# Patient Record
Sex: MALE | Race: WHITE
[De-identification: names, ages, dates, MRNs, and addresses within clinical notes are randomized per-mention and may not be internally consistent; named-entity substitution may affect disease eponyms.]

---

## 2018-05-17 NOTE — PULMONARY FUNCTION TEST
Pulmonary Function Test


Date of Procedure:: 05/17/18


INDICATION:: Dyspnea chronic respiratory failure


Referring Provider: DR. DANIEL FUNEZ


Technician: Mauri Lindsay RRT,RCP





- Report


Spirometry: 





FVC 1.80 L 39%    postbronchodilator 2.74 liters 58%





FEV1 0.73 L 19%    postbronchodilator 0.74 L 20%





FEV1/FVC % 41    postbronchodilator 27    predicted 80





FEF 25-75% 0.46 12%    postbronchodilator 0.26 7%


Impression: 





Severe obstructive ventilatory defect with insignificant response to 

bronchodilator therapy this in and of itself does not preclude a clinical trial 

of bronchodilator therapy.Restrictive defect is implied but not diagnosed on 

spirometry alone.  (Restrictive defect may mask the degree of obstruction)

## 2018-05-17 NOTE — RADIOLOGY REPORT (SQ)
EXAM DESCRIPTION: Right upper quadrant ultrasound



CLINICAL HISTORY: RUQ and LUQ pain, pancreatitis 



COMPARISON: None.



TECHNIQUE: Real-time sonographic images of the right upper

abdomen were obtained using a curved multihertz transducer.



FINDINGS:



Pancreas: The visualized portions of the pancreas are

unremarkable.



Vascular: The visualized portions of the aorta and IVC are

unremarkable.



Liver: The liver has normal contour and echogenicity. Hepatopedal

flow in the portal vein. The common bile duct measures 0.4 cm.



Gallbladder: Normal gallbladder wall thickness. Mobile echogenic

structures with posterior shadowing. No pericholecystic fluid.



Right Kidney: The right kidney measures 11.3 cm in length. No

hydronephrosis, solid renal mass, or shadowing calculi.







IMPRESSION:



1. Cholelithiasis without other evidence of acute cholecystitis.

## 2018-05-17 NOTE — RADIOLOGY REPORT (SQ)
EXAM DESCRIPTION:  CHOLANGIOGRAM OPERATIVE



COMPLETED DATE/TIME:  5/17/2018 5:44 pm



REASON FOR STUDY:  CHOLANGIOGRAM I11.0  HYPERTENSIVE HEART DISEASE WITH HEART FAILURE



COMPARISON:  None.



FLUOROSCOPY TIME:  0 minutes.

2 images saved to PACS.



TECHNIQUE:  2 images were obtained from an intraoperative cholangiogram.



LIMITATIONS:  None.



FINDINGS:  There is opacification of the bile ducts, cystic duct remnants and second portion of the d
uodenum without evidence of fixed filling defect or significant extravasation.



IMPRESSION:  INTRAOPERATIVE CHOLANGIOGRAM.



COMMENT:  Quality :  Final reports for procedures using fluoroscopy that document radiation exp
osure indices, or exposure time and number of fluorographic images (if radiation exposure indices are
 not available)



TECHNICAL DOCUMENTATION:  JOB ID:  [id number]

 2011 [employer]- All Rights Reserved



Reading location - IP/workstation name: [email or web address]

## 2018-05-17 NOTE — PDOC CONSULTATION
Consultation


Consult Date: 18


Consult reason:: abdominal pain, pancreatitis





History of Present Illness


Admission Date/PCP: 


  18 07:42





  ABDULAZIZ EVANS MD





Patient complains of: abdominal pain


History of Present Illness: 


PING WALKER is a 67 year old male with a 4 day hx of not feeling well;  last 

aftrnoon he started c/o diffuse abdominal pain, then retrosternal as per 

heartburn.  He prsented to the and he underwent blood work significant for 

elevated lipase and US of the gallbladder significant for gallstones.  The 

patient has a severe personal and family hx of COPD (father and grandfather 

 of COPD and he was a heavy smoker, 1-2 ppd for years) and he is oxygen 

dependent.








Past Medical History


Cardiac Medical History: Reports: Hyperlipidema, Hypertension


Pulmonary Medical History: Reports: Chronic Obstructive Pulmonary Disease (COPD)


GI Medical History: Reports: Gastroesophageal Reflux Disease





Social History


Smoking Status: Never Smoker





Family History


Family History: Reviewed & Not Pertinent


Parental Family History Reviewed: Yes - COPD


Children Family History Reviewed: No


Sibling(s) Family History Reviewed.: No





Medication/Allergy


Home Medications: 








Albuterol Sulfate [Proventil Hfa] 1 - 2 puff IH QID PRN 18 


Aspirin [Aspirin 81 mg Chewable Tablet] 1 tab PO DAILY 18 


Atorvastatin Calcium 1 cap PO DAILY 18 


Budesonide/Formoterol Fumarate [Symbicort 160-4.5 Mcg Inhaler] 1 - 2 puff IH 

BID 18 


Diltiazem HCl [Dilt-Xr] 1 cap PO DAILY 18 


Guaifenesin/P-Ephed HCl [Mucinex D Tablet] 1 each PO DAILY 18 


Magnesium 3 tab PO DAILY 18 


Omeprazole 2 cap PO DAILY 18 


Tiotropium Bromide [Spiriva Handihaler 18 mcg/dose (30 Dose)] 1 cap IH DAILY  


Triamterene/Hydrochlorothiazid [Triamterene-Hctz 37.5-25 mg Cp] 1 each PO DAILY 

18 








Allergies/Adverse Reactions: 


 





No Known Allergies Allergy (Verified 18 05:56)


 











Physical Exam


Vital Signs: 


 











Temp Pulse Resp BP Pulse Ox


 


 97.5 F   69   16   125/61   98 


 


 18 05:20  05/17/18 05:20  18 07:01  18 07:01  18 07:01











General appearance: PRESENT: no acute distress, cooperative


Eye exam: PRESENT: conjunctiva pink, EOMI


Mouth exam: PRESENT: neck supple


Neck exam: PRESENT: full ROM


Respiratory exam: PRESENT: clear to auscultation arturo


Cardiovascular exam: PRESENT: RRR


GI/Abdominal exam: PRESENT: distended, soft, tenderness - RUQ, no peritoneal 

signs


Extremities exam: PRESENT: full ROM


Musculoskeletal exam: PRESENT: full ROM





Results


Impressions: 


 





Abdomen Ultrasound  18 06:12


IMPRESSION:


 


1. Cholelithiasis without other evidence of acute cholecystitis.


 














Assessment & Plan





- Plan Summary


Plan Summary: 





A/





Abdominal pain


Elevated lipase (743)


Leukocytosis (14.7)


US of the gallbladder demonstrated gallstones, no evidence of cholecystitis


CBD 4 mm


total bilirubin 1.2, with slight elevation of the direct bilirubin (0.8)


Severe COPD with multiple inhaler daily regimen


Oxygen dependency





P/





NPO


IVF


Laparoscopic cholecystectomy, possible open, possible cholangiogram


Mefoxin 2 gr IVPB preop


preop EKG


Patient to be admitted by Hospitalist service








Procedure, risks, benefits, complications explained to the patient including 

bleeding form the liver and / or injury of the common bile duct which might 

require transfer to a tertiary center for repair.  he understands all the above 

and decided to proceed.

## 2018-05-17 NOTE — RADIOLOGY REPORT (SQ)
EXAM DESCRIPTION:  CHEST 2 VIEWS



COMPLETED DATE/TIME:  5/17/2018 7:53 am



REASON FOR STUDY:  pre op



COMPARISON:  None.



EXAM PARAMETERS:  NUMBER OF VIEWS: two views

TECHNIQUE: Digital Frontal and Lateral radiographic views of the chest acquired.

RADIATION DOSE: NA

LIMITATIONS: none



FINDINGS:  LUNGS AND PLEURA: There is some minimal linear densities in the right lung base most consi
stent with atelectatic changes.  Chronic appearing changes are identified.  Remaining lung fields are
 clear.  No pleural effusions are identified.  I cannot exclude a component of obstructive lung disea
se.

MEDIASTINUM AND HILAR STRUCTURES: No masses or contour abnormalities.

HEART AND VASCULAR STRUCTURES: Heart normal size.  No evidence for failure.

BONES: No acute findings.

HARDWARE: None in the chest.

OTHER: No other significant finding.



IMPRESSION:  Minimal linear densities in the right lung base most consistent with atelectatic changes
.  Remaining lung fields are free of active infiltrates.  Other findings as noted above



TECHNICAL DOCUMENTATION:  JOB ID:  [id number]

 2011 [employer]- All Rights Reserved



Reading location - IP/workstation name: [email or web address]

## 2018-05-17 NOTE — OPERATIVE REPORT
Operative Report


DATE OF SURGERY: 05/17/18


PREOPERATIVE DIAGNOSIS: 1.  Acute cholecystitis with cholelithiasis.  2.  

Elevated lipase


POSTOPERATIVE DIAGNOSIS: Same with grossly normal intraoperative cholangiogram


OPERATION: 1.  Laparoscopic cholecystectomy.  2.  Performance of intraoperative 

cholangiography.  3.  Interpretation of intraoperative cholangiography.  4.  

Extremely difficult modifier


SURGEON: THAO BHATTI


ANESTHESIA: GA


TISSUE REMOVED OR ALTERED: Gallbladder with contents


COMPLICATIONS: 





None


ESTIMATED BLOOD LOSS: 250


INTRAOPERATIVE FINDINGS: See below


PROCEDURE: 


After obtaining informed consent, the patient was taken to the operating room.  

General  Anesthesia was induced; the arms were extended, and the abdomen was 

exposed, and prepped and draped in a sterile fashion.  Instrumentation was set 

up for laparoscopic cholecystectomy.


 


Surgical plan and surgical timeout were conducted.


 


A vertical incision was made above the umbilicus, and a verres needle was 

inserted uneventfully into the peritoneal cavity.  Pneumoperitoneum was 

established.


 


The verres needle was removed and a 5 mm trocar was inserted and a 5 mm 

flexible laparoscope was inserted.  Visualization of the peritoneal cavity 

confirmed safe uneventful entry.  Under direct visualization 3 additional 5 mm 

ports were established, one in the subxiphoid position and second in the 

subcostal position.





The findings are significant for acutely slightly shrunken down but dilated 

edematous gallbladder consistent with acute cholecystitis.  Using a 

percutaneous laparoscopic trocar, we aspirated the gallbladder of white clear 

bile.  Grasper placed in the gallbladder fundus and infundibulum, adhesions 

were taken down between the gallbladder and the gastroduodenal region.  Despite 

taking these adhesions down, the tissue was so edematous it was very difficult 

to get a grasp of the infundibulum.





Therefore we switch to a top down approach.  The gallbladder was removed from 

the liver bed using hook cautery dissection in a methodical painstaking 

fashion.  This took approximately 40 minutes to 1 hour due to adhesions, edema, 

bleeding.  We used Surgicel to control bleeding mostly from the liver bed.





Eventually had the gallbladder suspended from its infundibulum.  We now used 

harmonic scalpel to skeletonize the neck of the gallbladder and eventually 

getting the cystic duct in view.  This was a edematous cystic duct with 

fibrinous peel around it and I decided to amputate the gallbladder from the 

neck leaving the cystic duct open and exposed.  The gallbladder was placed at 

the side.  It had been opened with the spillage of some stones earlier in the 

operation.  The cystic duct opening was draining clear bile which was 

encouraging.  We elected to proceed with the intraoperative cholangiogram





The balloon secured cholangiogram catheter was brought onto the field, and 

advanced through 1 of the right upper quadrant trochars.  The cholangiogram was 

threaded into the opening of the cystic duct, and the balloon inflated.





We level the patient out, and proceeded to perform a series of intraoperative 

cholangiogram using full strength Isovue contrast approximately 15 cc total 

delivered to the patient.  Visualization using intraoperative fluoroscopy 

showed illumination of the common hepatic duct, common bile duct, the majority 

of the right and some of the left hepatic branches.  There was contrast flowing 

into the duodenum.  There was some extravasation around the catheter exit site.

  There did not appear to be a violation the common bile duct or filling defect 

therefore we felt that essentially the cholangiogram was within normal limits.





We level the patient out, remove the cholangiogram catheter, and secured the 

cystic duct opening placing a 0 Endoloop into position with success.





We now control bleeding from the liver bed with Surgicel which was felt to be 

satisfactory.  A drain was placed in the right upper quadrant, 15 Swedish Aung.

  We felt the total 250 cc of bleeding was a reasonable estimate.  There was 

nothing mechanical to cauterize or oversew at this point.  We did place the 

specimen, gallbladder with stones in an Endobag and brought the estimate out of 

the patient after extending the supraumbilical port site incision





Specimen was sent to pathology.  We returned the peritoneal cavity check for 

bleeding there was none.  Drain was placed in the subhepatic position and 

secured to the skin with 2-0 suture.  Wounds closed with 0 Vicryl 3-0 Vicryl 

benzoin Steri-Strips








 


The patient was extubated, and taken to the recovery room in stable condition.

## 2018-05-17 NOTE — PDOC H&P
History of Present Illness


Admission Date/PCP: 


  05/17/18 07:42





  ABDULAZIZ EVANS MD





Patient complains of: Epigastric abdominal pain


History of Present Illness: 


PING WALKER is a 67 year old male with a past medical history of





COPD on 3 L by nasal cannula


Chronic hypoxic respiratory failure


Hypertension


GERD


Hyperlipidemia


Vitamin D deficiency





Past surgical history: None





Outpatient medications:


Cardizem  mg daily


Triamterene/hydrochlorothiazide 37.5/25 mg daily


Aspirin 81 mg daily


Prilosec 40 mg daily


Magnesium daily


Vitamin D3 daily


Mucinex daily


Spiriva


Albuterol MDI as needed


Symbicort





He presented to the hospital after developing sudden onset epigastric pain 

which was sharp and colicky.


He has been constipated for the past 2 days.


Denies any vomiting.  Appetite has been poor.


He was recently being treated with antibiotics that were started 4 days ago for 

a urinary tract infection.








Healthcare power of  is his son Chavez Walker.  Phone #[phone].


He requests to be a full code.





The patient reports chronic dyspnea on exertion which is unchanged.


Denies shortness of breath or chest pain at rest.  Denies exertional chest pain.


Does not recall ever having a stress test.


Denies a history of stroke or myocardial infarction.


No recent fever cough or illness other than a urinary tract infection.





Past Medical History


Cardiac Medical History: Reports: Hyperlipidema, Hypertension


Pulmonary Medical History: Reports: Chronic Obstructive Pulmonary Disease (COPD)


GI Medical History: Reports: Gastroesophageal Reflux Disease





Social History


Information Source: Patient


Smoking Status: Former Smoker


Frequency of Alcohol Use: None


Hx Recreational Drug Use: No





- Advance Directive


Resuscitation Status: Full Code





Family History


Family History: COPD


Parental Family History Reviewed: Yes


Children Family History Reviewed: Yes


Sibling(s) Family History Reviewed.: Yes





Medication/Allergy


Home Medications: 








Albuterol Sulfate [Proventil Hfa] 1 - 2 puff IH QID PRN 05/17/18 


Aspirin [Aspirin 81 mg Chewable Tablet] 1 tab PO DAILY 05/17/18 


Atorvastatin Calcium 1 cap PO DAILY 05/17/18 


Budesonide/Formoterol Fumarate [Symbicort 160-4.5 Mcg Inhaler] 1 - 2 puff IH 

BID 05/17/18 


Diltiazem HCl [Dilt-Xr] 1 cap PO DAILY 05/17/18 


Guaifenesin/P-Ephed HCl [Mucinex D Tablet] 1 each PO DAILY 05/17/18 


Magnesium 3 tab PO DAILY 05/17/18 


Omeprazole 2 cap PO DAILY 05/17/18 


Tiotropium Bromide [Spiriva Handihaler 18 mcg/dose (30 Dose)] 1 cap IH DAILY 05/ 17/18 


Triamterene/Hydrochlorothiazid [Triamterene-Hctz 37.5-25 mg Cp] 1 each PO DAILY 

05/17/18 








Allergies/Adverse Reactions: 


 





No Known Allergies Allergy (Verified 05/17/18 05:56)


 











Review of Systems


Constitutional: ABSENT: fever(s)


Eyes: ABSENT: visual disturbances


Ears: ABSENT: hearing changes


Nose, Mouth, and Throat: ABSENT: sore throat


Cardiovascular: PRESENT: dyspnea on exertion.  ABSENT: chest pain, edema


Respiratory: ABSENT: cough


Gastrointestinal: PRESENT: abdominal pain, constipation.  ABSENT: vomiting


Genitourinary: ABSENT: hematuria


Musculoskeletal: ABSENT: joint swelling


Integumentary: ABSENT: rash


Neurological: ABSENT: focal weakness


Psychiatric: ABSENT: hallucinations


Endocrine: ABSENT: heat intolerance


Hematologic/Lymphatic: ABSENT: easy bleeding


Allergic/Immunologic: ABSENT: seasonal rhinorrhea





Physical Exam


Vital Signs: 


 











Temp Pulse Resp BP Pulse Ox


 


 97.5 F   69   16   125/61   98 


 


 05/17/18 05:20  05/17/18 05:20  05/17/18 07:01  05/17/18 07:01  05/17/18 07:01











General appearance: PRESENT: no acute distress


Head exam: PRESENT: normocephalic


Eye exam: PRESENT: PERRLA


Ear exam: PRESENT: normal external ear exam


Mouth exam: PRESENT: moist


Throat exam: ABSENT: post pharyngeal erythema


Neck exam: ABSENT: tenderness


Respiratory exam: PRESENT: clear to auscultation arturo, symmetrical, unlabored


Cardiovascular exam: PRESENT: RRR.  ABSENT: systolic murmur


Vascular exam: ABSENT: pallor


GI/Abdominal exam: PRESENT: normal bowel sounds, soft, tenderness


Rectal exam: PRESENT: deferred


Gentrourinary exam: ABSENT: indwelling catheter


Extremities exam: ABSENT: pedal edema


Musculoskeletal exam: PRESENT: normal inspection


Neurological exam: PRESENT: alert, awake, oriented to person, oriented to place

, oriented to time, oriented to situation


Psychiatric exam: PRESENT: appropriate affect


Skin exam: ABSENT: rash





Results


Impressions: 


 





Abdomen Ultrasound  05/17/18 06:12


IMPRESSION:


 


1. Cholelithiasis without other evidence of acute cholecystitis.


 














Assessment & Plan





- Diagnosis


(1) COPD (chronic obstructive pulmonary disease)


Qualifiers: 


   COPD type: emphysema   Emphysema type: unspecified   Qualified Code(s): 

J43.9 - Emphysema, unspecified   


Is this a current diagnosis for this admission?: Yes   


Plan: 


Stable.  Continue oxygen supplementation.  Continue Spiriva and Symbicort.


Nebs as needed.


Pulmonology evaluation for optimization prior to surgery.








(2) Chronic respiratory failure with hypoxia


Is this a current diagnosis for this admission?: Yes   


Plan: 


Stable.  As above.








(3) Hypertension


Is this a current diagnosis for this admission?: Yes   


Plan: 


Continue Cardizem.  Hold diuretics for now given n.p.o.








(4) Hyperlipidemia


Is this a current diagnosis for this admission?: Yes   


Plan: 


Restart statin post surgery.


Aspirin on hold.








(5) GERD (gastroesophageal reflux disease)


Is this a current diagnosis for this admission?: Yes   


Plan: 


Proton pump inhibitor.








(6) Gallstone pancreatitis


Is this a current diagnosis for this admission?: Yes   


Plan: 


Management per surgical service.  N.p.o.  Analgesics for pain control.








- Time


Time Spent: 50 to 70 Minutes





- Inpatient Certification


Medical Necessity: Need for Surgery, Risk of Complication if Not Cared For in 

Hospital

## 2018-05-17 NOTE — PDOC CONSULTATION
Consultation


Consult Date: 18


Attending physician:: DANIEL FUNEZ


Consult reason:: Chronic respiratory failure





History of Present Illness


Admission Date/PCP: 


  18 07:47





  ABDULAZIZ EVANS MD





History of Present Illness: 


PING WALKER is a 67 year old male,presented to the emergency room after several 

days [place] increasing nausea and right upper quadrant pain subsequently was felt 

that he had a acute cholecystitis.  He has had a long history of chronic 

respiratory failure and has been on oxygen at home for the last 3 years.  He 

denies increasing dyspnea on exertion no shortness of breath he has occasional 

cough productive of clear yellow phlegm no hemoptysis his PPD was negative the 

dates unknown he denies history of chronic lung disease as a child or 

adolescent.  He admits to exposure to large amounts of passive smoke as a child 

as well as an adult he himself has smoked a pack a day for 50 years has been 

without cigarettes for the last 3 years.  He worked his entire life in 

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 

probably asbestos.  He is 1 dog and 1 cat recently moved to the area from 

[place].  He denies angina-like chest pain sleeps on 2 pillows no PND rare 

nocturnal cough and recent onset of edema daily he admits to snoring restless 

sleep nocturia 2-3 times per night unrestful sleep.





Past Medical History


Cardiac Medical History: Reports: Hyperlipidema, Hypertension


Pulmonary Medical History: Reports: Chronic Obstructive Pulmonary Disease (COPD)

, Respiratory Failure


EENT Medical History: 


   Denies: Nose, Throat


Neurological Medical History: 


   Denies: Multiple Sclerosis, Seizures


Endocrine Medical History: 


   Denies: Diabetes Mellitus Type 1, Hyperthyroidism, Hypothyroidism


Renal/ Medical History: 


   Denies: End Stage Renal Disease, Nephrolithiasis


GI Medical History: Reports: Gastroesophageal Reflux Disease


   Denies: Crohn's Disease, Hepatitis, Peptic Ulcer Disease, Ulcerative Colitis


Musculoskeltal Medical History: 


   Denies: Gout


Skin Medical History: 


   Denies: Eczema, Psoriasis


Traumatic Medical History: 


   Denies: Pneumothorax, Stab Wound


Hematology: 


   Denies: Hemophilia, Sickle Cell Disease


Infectious Medical History: 


   Denies: Hepatitis B, Hepatitis C, HIV





Social History


Information Source: Patient, [facility] Records


Have you worked as/with:: 


Smoking Status: Former Smoker


Cigarettes Packs Per Day: 1


Number of Years Smokin


Last Time Smoked: 3


Passive smoke exposure as: Both


Frequency of Alcohol Use: None


Hx Recreational Drug Use: No


Hx Prescription Drug Abuse: No


Do you have pets?: Yes


Have you had any respiratory illnesses as a child?: No


Have you been exposed to any sick contacts recently?: No


Have you had any recent respiratory illnesses?: No





Family History


Family History: COPD, CVA


Parental Family History Reviewed: Yes


Children Family History Reviewed: Yes


Sibling(s) Family History Reviewed.: Yes





Medication/Allergy


Home Medications: 








Albuterol Sulfate [Proventil Hfa] 2 puff IH QID PRN 18 


Aspirin [Aspirin 81 mg Chewable Tablet] 81 mg PO DAILY 18 


Atorvastatin Calcium 40 mg PO DAILY 18 


Budesonide/Formoterol Fumarate [Symbicort 160-4.5 Mcg Inhaler] 2 puff IH BID  


Cholecalciferol (Vitamin D3) [Vitamin D3 1000 Unit Tablet] 1,000 unit PO DAILY 

18 


Ciprofloxacin HCl [Cipro 500 mg Tablet] 500 mg PO BID 18 


Diltiazem HCl [Dilt-Xr] 180 mg PO DAILY 18 


Guaifenesin/P-Ephed HCl [Mucinex D Tablet] 1 tab PO DAILY 18 


Magnesium 750 mg PO DAILY 18 


Omeprazole 40 mg PO DAILY 18 


Tiotropium Bromide [Spiriva Handihaler 18 mcg/dose (30 Dose)] 1 cap IH DAILY  


Triamterene/Hydrochlorothiazid [Triamterene-Hctz 37.5-25 mg Cp] 1 tab PO DAILY 

18 








Allergies/Adverse Reactions: 


 





No Known Allergies Allergy (Verified 18 05:56)


 











Review of Systems


Constitutional: ABSENT: headache(s), night sweats


Eyes: ABSENT: visual disturbances


Ears: ABSENT: hearing changes


Nose, Mouth, and Throat: ABSENT: sore throat


Cardiovascular: PRESENT: dyspnea on exertion, edema.  ABSENT: palpitations


Respiratory: ABSENT: hemoptysis


Gastrointestinal: PRESENT: abdominal pain, constipation, dysphagia, melena.  

ABSENT: coffee ground emesis, heartburn, hematemesis, hematochezia


Genitourinary: ABSENT: difficulty urinating, dysuria, hematuria


Integumentary: ABSENT: pruritus, rash


Neurological: ABSENT: abnormal gait, abnormal movements, abnormal speech, focal 

weakness, frequent falls, lack of coordination, memory loss


Psychiatric: ABSENT: hallucinations, homidical ideation, suicidal ideation


Endocrine: ABSENT: cold intolerance, heat intolerance, polydipsia, polyuria


Hematologic/Lymphatic: ABSENT: easy bruising


Allergic/Immunologic: PRESENT: seasonal rhinorrhea





Physical Exam


Vital Signs: 


 











Temp Pulse Resp BP Pulse Ox


 


 97.5 F   69   16   125/61   98 


 


 18 05:20  18 05:20  18 07:01  18 07:01  18 07:01











General appearance: PRESENT: cooperative, disheveled, mild distress, well-

developed, well-nourished


Head exam: PRESENT: atraumatic, normocephalic


Eye exam: PRESENT: conjunctiva pale, EOMI.  ABSENT: nystagmus, periorbital 

swelling, scleral icterus


Mouth exam: PRESENT: dry mucosa, neck supple, tongue midline


Teeth exam: PRESENT: edentulous


Neck exam: ABSENT: carotid bruit, JVD, lymphadenopathy, thyromegaly, tracheal 

deviation, tracheostomy


Respiratory exam: PRESENT: decreased breath sounds, prolonged expiratory phas, 

rhonchi, symmetrical, unlabored.  ABSENT: rales, retraction, stridor, tachypnea


Cardiovascular exam: PRESENT: RRR, rubs, +S2


Pulses: PRESENT: normal radial pulses


GI/Abdominal exam: PRESENT: diminished bowel sounds, guarding, tenderness


Gentrourinary exam: PRESENT: indwelling catheter


Extremities exam: ABSENT: calf tenderness, clubbing, joint swelling, pedal edema


Musculoskeletal exam: ABSENT: deformity, dislocation


Neurological exam: PRESENT: alert, awake


Psychiatric exam: PRESENT: flat affect


Skin exam: PRESENT: dry, warm





Results


Impressions: 


 





Abdomen Ultrasound  18 06:12


IMPRESSION:


 


1. Cholelithiasis without other evidence of acute cholecystitis.


 








Chest X-Ray  18 07:41


IMPRESSION:  Minimal linear densities in the right lung base most consistent 

with atelectatic changes.  Remaining lung fields are free of active 

infiltrates.  Other findings as noted above


 














Assessment & Plan





- Diagnosis


(1) COPD (chronic obstructive pulmonary disease)


Qualifiers: 


   COPD type: emphysema   Emphysema type: unspecified   Qualified Code(s): 

J43.9 - Emphysema, unspecified   


Is this a current diagnosis for this admission?: Yes   


Plan: 


Continue current bronchodilator therapy stable at this time hopefully there 

will be no need for systemic steroids








(2) Chronic respiratory failure with hypoxia


Is this a current diagnosis for this admission?: Yes   


Plan: 


Long-standing O2 at home greater than 3 years continue supplemental oxygen 

maintaining sats between 90 and 94%








(3) GERD (gastroesophageal reflux disease)


Is this a current diagnosis for this admission?: Yes   





(4) Gallstone pancreatitis


Is this a current diagnosis for this admission?: Yes   


Plan: 


Probable surgical candidate

## 2018-05-17 NOTE — ER DOCUMENT REPORT
ED General





- General


Chief Complaint: Abdominal Pain


Stated Complaint: ABDOMINAL PAIN


Time Seen by Provider: 05/17/18 06:06


Mode of Arrival: Ambulatory


Information source: Patient


Notes: 





67-year-old male presents with sudden epigastric abdominal pain after eating.  

Patient has had a similar episode approximately 1 year ago with no diagnosis, 

he states that he has no history of alcohol use has not drink in years, denies 

any fevers or chills admits to mild nausea pain started last night


TRAVEL OUTSIDE OF THE [place] IN LAST 30 DAYS: No





- HPI


Onset: Yesterday


Onset/Duration: Sudden


Quality of pain: Burning, Sharp


Severity: Mild


Pain Level: 1


Associated symptoms: Nausea


Exacerbated by: Food


Relieved by: Denies


Similar symptoms previously: Yes


Recently seen / treated by doctor: No





- Related Data


Allergies/Adverse Reactions: 


 





No Known Allergies Allergy (Verified 05/17/18 05:56)


 











Past Medical History





- Social History


Smoking Status: Never Smoker


Cigarette use (# per day): No


Chew tobacco use (# tins/day): No


Smoking Education Provided: No


Family History: Reviewed & Not Pertinent





Review of Systems





- Review of Systems


Notes: 





REVIEW OF SYSTEMS:


CONSTITUTIONAL :  Denies fever,  chills, or sweats.  Denies recent illness.


EENT:   Denies eye, ear, throat, or mouth pain or symptoms.  Denies nasal or 

sinus congestion or discharge.  Denies throat, tongue, or mouth swelling or 

difficulty swallowing.


CARDIOVASCULAR:  Denies chest pain.  Denies palpitations or racing or irregular 

heart beat.  Denies ankle edema.


RESPIRATORY:  Denies cough, cold, or chest congestion.  Denies shortness of 

breath, difficulty breathing, or wheezing.


GASTROINTESTINAL: Admits to abdominal pain


GENITOURINARY:  Denies difficulty urinating, painful urination, burning, 

frequency, blood in urine, or discharge.


MUSCULOSKELETAL:  Denies back or neck pain or stiffness.  Denies joint pain or 

swelling.


SKIN:   Denies rash, lesions or sores.


HEMATOLOGIC :   Denies easy bruising or bleeding.


LYMPHATIC:  Denies swollen, enlarged glands.


NEUROLOGICAL:  Denies confusion or altered mental status.  Denies passing out 

or loss of consciousness.  Denies dizziness or lightheadedness.  Denies 

headache.  Denies weakness or paralysis or loss of use of either side.  Denies 

problems with gait or speech.  Denies sensory loss, numbness, or tingling.  

Denies seizures.


PSYCHIATRIC:  Denies anxiety or stress.  Denies depression, suicidal ideation, 

or homicidal ideation.





ALL OTHER SYSTEMS REVIEWED AND NEGATIVE.





Dictation was performed using Dragon voice recognition software 





PHYSICAL EXAMINATION:





GENERAL: Well-appearing, well-nourished and in no acute distress.





HEAD: Atraumatic, normocephalic.





EYES: Pupils equal round and reactive to light, extraocular movements intact, 

sclera anicteric, conjunctiva are normal.





ENT: Nares patent, oropharynx clear without exudates.  Moist mucous membranes.





NECK: Normal range of motion, supple without lymphadenopathy





LUNGS: Breath sounds clear to auscultation bilaterally and equal.  No wheezes 

rales or rhonchi.





HEART: Regular rate and rhythm without murmurs





ABDOMEN: Soft, tender in the right upper quadrant epigastric region





Musculoskeletal: Normal range of motion, no pitting or edema.  No cyanosis.





NEUROLOGICAL: Cranial nerves grossly intact.  Normal speech, normal gait.  

Normal sensory, motor exams 





PSYCH: Normal mood, normal affect.





SKIN: Warm, Dry, normal turgor, no rashes or lesions noted.





Physical Exam





- Vital signs


Vitals: 


 











Temp Pulse Resp BP Pulse Ox


 


 97.5 F   69   22 H  125/62   98 


 


 05/17/18 05:20  05/17/18 05:20  05/17/18 05:20  05/17/18 05:20  05/17/18 05:20














Course





- Re-evaluation


Re-evalutation: 





05/17/18 06:28


Patient's lab work is consistent with pancreatitis, ultrasound is pending


05/17/18 07:24


Given white count elevation, gallstones noted on ultrasound, no masses on the 

pancreas, and elevated liver enzymes I do believe this is gallstone 

pancreatitis Dr. Day has been consulted


05/17/18 07:39


Dr Day requests admission to hospitalist service


Dr adame will speak with him regarding consult 





- Vital Signs


Vital signs: 


 











Temp Pulse Resp BP Pulse Ox


 


 97.5 F   69   16   125/61   98 


 


 05/17/18 05:20  05/17/18 05:20  05/17/18 07:01  05/17/18 07:01  05/17/18 07:01














- Laboratory


Result Diagrams: 


 05/17/18 05:48





 05/17/18 05:48


Laboratory results interpreted by me: 


 











  05/17/18 05/17/18





  05:48 05:48


 


WBC  14.7 H 


 


Band Neutrophils %  1 L 


 


Abs Neuts (Manual)  11.5 H 


 


Sodium   131.4 L


 


Chloride   86 L


 


Carbon Dioxide   32 H


 


Glucose   159 H


 


Direct Bilirubin   0.8 H


 


ALT   158 H


 


Alkaline Phosphatase   240 H


 


Lipase   743.0 H














- Diagnostic Test


Radiology reviewed: Image reviewed - Ultrasound abdomen limited consistent with 

cholelithiasis without cholecystitis, Reports reviewed





Discharge





- Discharge


Clinical Impression: 


 Gallstone pancreatitis





Condition: Stable


Disposition: ADMITTED AS OBSERVATION


Admitting Provider: Surgicalist


Unit Admitted: Medical Floor


Referrals: 


ABDULAZIZ EVANS MD [Primary Care Provider] - Follow up as needed

## 2018-05-18 NOTE — PDOC PROGRESS REPORT
Subjective


Progress Note for:: 05/18/18


Subjective:: 





no c/o


Reason For Visit: 


PANCREATITIS, GALL STONE








Physical Exam


Vital Signs: 


 











Temp Pulse Resp BP Pulse Ox


 


 98.1 F   78   16   125/49 L  100 


 


 05/17/18 22:45  05/17/18 22:45  05/17/18 22:45  05/17/18 22:45  05/18/18 00:47








 Intake & Output











 05/17/18 05/18/18 05/19/18





 06:59 06:59 06:59


 


Intake Total  4050 


 


Output Total  4100 


 


Balance  -50 


 


Weight  96.5 kg 











General appearance: PRESENT: no acute distress, cooperative


Respiratory exam: PRESENT: clear to auscultation arturo


Cardiovascular exam: PRESENT: RRR


GI/Abdominal exam: PRESENT: normal bowel sounds, soft.  ABSENT: other - 

incisions c/d/i





Results


Laboratory Results: 


 





 05/18/18 03:47 





 05/18/18 03:47 





 











  05/17/18 05/18/18 05/18/18





  21:30 03:47 03:47


 


WBC   15.5 H 


 


RBC   4.02 L 


 


Hgb   12.2 L 


 


Hct   35.9 L 


 


MCV   89 


 


MCH   30.3 


 


MCHC   34.0 


 


RDW   13.1 


 


Plt Count   387 


 


Sodium    132.9 L


 


Potassium    4.8


 


Chloride    95 L


 


Carbon Dioxide    28


 


Anion Gap    10


 


BUN    7


 


Creatinine    0.52


 


Est GFR ( Amer)    > 60


 


Est GFR (Non-Af Amer)    > 60


 


Glucose    112 H


 


Calcium    8.8


 


Phosphorus    3.9


 


Magnesium    1.7


 


Total Bilirubin    0.9


 


AST    98 H


 


ALT    180 H


 


Alkaline Phosphatase    174 H


 


Total Protein    5.7 L


 


Albumin    3.1 L


 


Triglycerides    79


 


Cholesterol    152.28


 


LDL Cholesterol Direct    106 H


 


VLDL Cholesterol    16.0


 


HDL Cholesterol    32 L


 


Lipase    94.0


 


TSH   


 


Urine Color  YELLOW  


 


Urine Appearance  CLEAR  


 


Urine pH  6.0  


 


Ur Specific Gravity  1.014  


 


Urine Protein  NEGATIVE  


 


Urine Glucose (UA)  50 H  


 


Urine Ketones  20 H  


 


Urine Blood  NEGATIVE  


 


Urine Nitrite  NEGATIVE  


 


Ur Leukocyte Esterase  NEGATIVE  


 


Urine WBC (Auto)  3  


 


Urine RBC (Auto)  1  














  05/18/18





  03:47


 


WBC 


 


RBC 


 


Hgb 


 


Hct 


 


MCV 


 


MCH 


 


MCHC 


 


RDW 


 


Plt Count 


 


Sodium 


 


Potassium 


 


Chloride 


 


Carbon Dioxide 


 


Anion Gap 


 


BUN 


 


Creatinine 


 


Est GFR ( Amer) 


 


Est GFR (Non-Af Amer) 


 


Glucose 


 


Calcium 


 


Phosphorus 


 


Magnesium 


 


Total Bilirubin 


 


AST 


 


ALT 


 


Alkaline Phosphatase 


 


Total Protein 


 


Albumin 


 


Triglycerides 


 


Cholesterol 


 


LDL Cholesterol Direct 


 


VLDL Cholesterol 


 


HDL Cholesterol 


 


Lipase 


 


TSH  0.18 L


 


Urine Color 


 


Urine Appearance 


 


Urine pH 


 


Ur Specific Gravity 


 


Urine Protein 


 


Urine Glucose (UA) 


 


Urine Ketones 


 


Urine Blood 


 


Urine Nitrite 


 


Ur Leukocyte Esterase 


 


Urine WBC (Auto) 


 


Urine RBC (Auto) 








 











  05/18/18





  03:47


 


NT-Pro-B Natriuret Pep  303











Impressions: 


 





Cholangiogram  05/17/18 00:00


IMPRESSION:  INTRAOPERATIVE CHOLANGIOGRAM.


 








Abdomen Ultrasound  05/17/18 06:12


IMPRESSION:


 


1. Cholelithiasis without other evidence of acute cholecystitis.


 








Chest X-Ray  05/17/18 07:41


IMPRESSION:  Minimal linear densities in the right lung base most consistent 

with atelectatic changes.  Remaining lung fields are free of active 

infiltrates.  Other findings as noted above


 














Assessment & Plan





- Diagnosis


(1) Gallstone pancreatitis


Is this a current diagnosis for this admission?: Yes   





- Plan Summary


Plan Summary: 





A/





POD#1 after lap [place]


VSS AF


WBC elevated 15


CMP normal


RASHMI in place 25 mL serosanguinous








P/





D/C narcotics


Continue IV Abx


patient to remain in the hospital until the WBC trends toward normal


decrease IVF

## 2018-05-18 NOTE — PDOC PROGRESS REPORT
Subjective


Progress Note for:: 05/18/18


Subjective:: 





Patient is feeling bloated and gassy.  He has not had a bowel movement.  He is 

not in severe pain but just feels uncomfortable.  He has been able to walk in 

the hallways twice today.  Eating a little bit but does not want to eat too 

much because he is so bloated feeling.  No chest pain.  Secondary to feeling 

bloated he feels that he is having a hard time taking a deep breath but is in 

no distress.  No fevers or chills.


Reason For Visit: 


PANCREATITIS, GALL STONE








Physical Exam


Vital Signs: 


 











Temp Pulse Resp BP Pulse Ox


 


 98.1 F   79   17   137/73 H  100 


 


 05/18/18 16:00  05/18/18 17:12  05/18/18 17:12  05/18/18 16:00  05/18/18 16:00








 Intake & Output











 05/17/18 05/18/18 05/19/18





 06:59 06:59 06:59


 


Intake Total  4050 1075


 


Output Total  4100 690


 


Balance  -50 385


 


Weight  96.5 kg 











General appearance: PRESENT: no acute distress, cooperative


Head exam: PRESENT: atraumatic, normocephalic


Eye exam: PRESENT: EOMI


Ear exam: PRESENT: normal external ear exam


Mouth exam: PRESENT: moist, neck supple


Respiratory exam: PRESENT: clear to auscultation arturo, unlabored.  ABSENT: rales

, rhonchi, wheezes


Cardiovascular exam: PRESENT: RRR.  ABSENT: systolic murmur


Pulses: PRESENT: normal radial pulses


GI/Abdominal exam: PRESENT: distended, soft, tenderness, other - Ecchymoses 

around laparoscopic incision sites, minor


Rectal exam: PRESENT: deferred


Musculoskeletal exam: PRESENT: ambulatory, normal inspection


Neurological exam: PRESENT: alert, awake, oriented to person, oriented to place

, oriented to situation, CN II-XII grossly intact


Psychiatric exam: PRESENT: appropriate affect.  ABSENT: anxious


Skin exam: PRESENT: dry, intact, warm





Results


Laboratory Results: 


 





 05/18/18 03:47 





 05/18/18 03:47 





 











  05/17/18 05/18/18 05/18/18





  21:30 03:47 03:47


 


WBC   15.5 H 


 


RBC   4.02 L 


 


Hgb   12.2 L 


 


Hct   35.9 L 


 


MCV   89 


 


MCH   30.3 


 


MCHC   34.0 


 


RDW   13.1 


 


Plt Count   387 


 


Sodium    132.9 L


 


Potassium    4.8


 


Chloride    95 L


 


Carbon Dioxide    28


 


Anion Gap    10


 


BUN    7


 


Creatinine    0.52


 


Est GFR ( Amer)    > 60


 


Est GFR (Non-Af Amer)    > 60


 


Glucose    112 H


 


Calcium    8.8


 


Phosphorus    3.9


 


Magnesium    1.7


 


Total Bilirubin    0.9


 


AST    98 H


 


ALT    180 H


 


Alkaline Phosphatase    174 H


 


Total Protein    5.7 L


 


Albumin    3.1 L


 


Triglycerides    79


 


Cholesterol    152.28


 


LDL Cholesterol Direct    106 H


 


VLDL Cholesterol    16.0


 


HDL Cholesterol    32 L


 


Lipase    94.0


 


TSH   


 


Urine Color  YELLOW  


 


Urine Appearance  CLEAR  


 


Urine pH  6.0  


 


Ur Specific Gravity  1.014  


 


Urine Protein  NEGATIVE  


 


Urine Glucose (UA)  50 H  


 


Urine Ketones  20 H  


 


Urine Blood  NEGATIVE  


 


Urine Nitrite  NEGATIVE  


 


Ur Leukocyte Esterase  NEGATIVE  


 


Urine WBC (Auto)  3  


 


Urine RBC (Auto)  1  














  05/18/18





  03:47


 


WBC 


 


RBC 


 


Hgb 


 


Hct 


 


MCV 


 


MCH 


 


MCHC 


 


RDW 


 


Plt Count 


 


Sodium 


 


Potassium 


 


Chloride 


 


Carbon Dioxide 


 


Anion Gap 


 


BUN 


 


Creatinine 


 


Est GFR ( Amer) 


 


Est GFR (Non-Af Amer) 


 


Glucose 


 


Calcium 


 


Phosphorus 


 


Magnesium 


 


Total Bilirubin 


 


AST 


 


ALT 


 


Alkaline Phosphatase 


 


Total Protein 


 


Albumin 


 


Triglycerides 


 


Cholesterol 


 


LDL Cholesterol Direct 


 


VLDL Cholesterol 


 


HDL Cholesterol 


 


Lipase 


 


TSH  0.18 L


 


Urine Color 


 


Urine Appearance 


 


Urine pH 


 


Ur Specific Gravity 


 


Urine Protein 


 


Urine Glucose (UA) 


 


Urine Ketones 


 


Urine Blood 


 


Urine Nitrite 


 


Ur Leukocyte Esterase 


 


Urine WBC (Auto) 


 


Urine RBC (Auto) 








 











  05/18/18





  03:47


 


NT-Pro-B Natriuret Pep  303











Impressions: 


 





Cholangiogram  05/17/18 00:00


IMPRESSION:  INTRAOPERATIVE CHOLANGIOGRAM.


 








Abdomen Ultrasound  05/17/18 06:12


IMPRESSION:


 


1. Cholelithiasis without other evidence of acute cholecystitis.


 








Chest X-Ray  05/17/18 07:41


IMPRESSION:  Minimal linear densities in the right lung base most consistent 

with atelectatic changes.  Remaining lung fields are free of active 

infiltrates.  Other findings as noted above


 














Assessment & Plan





- Diagnosis


(1) Gallstone pancreatitis


Is this a current diagnosis for this admission?: Yes   


Plan: 


Patient has undergone cholecystectomy.  He is being followed by surgery..  He 

is feeling a lot better.








(2) Abdominal distention


Is this a current diagnosis for this admission?: Yes   


Plan: 


Secondary to postop status and gallstone pancreatitis.  Patient is eating per 

surgical orders.  He is walking as much as possible to help with abdominal pain 

and distention.








(3) COPD (chronic obstructive pulmonary disease)


Qualifiers: 


   COPD type: emphysema   Emphysema type: unspecified   Qualified Code(s): 

J43.9 - Emphysema, unspecified   


Is this a current diagnosis for this admission?: Yes   


Plan: 


Stable, no changes








(4) Chronic respiratory failure with hypoxia


Is this a current diagnosis for this admission?: Yes   


Plan: 


Stable, no changes








- Time


Time Spent with patient: 15-24 minutes


Medications reviewed and adjusted accordingly: Yes





- Inpatient Certification


Based on my medical assessment, after consideration of the patient's 

comorbidities, presenting symptoms, or acuity I expect that the services needed 

warrant INPATIENT care.: Yes


I certify that my determination is in accordance with my understanding of 

Medicare's requirements for reasonable and necessary INPATIENT services [42 CFR 

412.3e].: Yes


Medical Necessity: Significant Comorbidiites Make Outpatient Treatment Too Risky

, Need Close Monitoring Due to Risk of Patient Decompensation

## 2018-05-19 NOTE — PDOC PROGRESS REPORT
Subjective


Progress Note for:: 05/19/18


Subjective:: 





Comfortable, tolerating PO well


Reason For Visit: 


PANCREATITIS, GALL STONE








Physical Exam


Vital Signs: 


 











Temp Pulse Resp BP Pulse Ox


 


 98.5 F   80   16   147/69 H  100 


 


 05/19/18 07:31  05/19/18 08:00  05/19/18 08:00  05/19/18 07:31  05/19/18 07:31








 Intake & Output











 05/18/18 05/19/18 05/20/18





 06:59 06:59 06:59


 


Intake Total 4050 2592 


 


Output Total 4100 2065 


 


Balance -50 527 


 


Weight 96.5 kg  











General appearance: PRESENT: no acute distress, cooperative


Respiratory exam: PRESENT: clear to auscultation arturo


Cardiovascular exam: PRESENT: RRR


GI/Abdominal exam: PRESENT: hypoactive bowel sounds, soft, other - wounds C/D/i

, RASHMI drain in the RUQ with serosanguinous fluid





Results


Laboratory Results: 


 





 05/19/18 04:31 





 05/19/18 04:31 





 











  05/19/18 05/19/18





  04:31 04:31


 


WBC  13.2 H 


 


RBC  3.93 L 


 


Hgb  12.0 L 


 


Hct  35.3 L 


 


MCV  90 


 


MCH  30.4 


 


MCHC  33.9 


 


RDW  13.2 


 


Plt Count  383 


 


Seg Neutrophils %  71.9 


 


Lymphocytes %  15.4 


 


Monocytes %  10.5 


 


Eosinophils %  1.4 


 


Basophils %  0.8 


 


Absolute Neutrophils  9.5 H 


 


Absolute Lymphocytes  2.0 


 


Absolute Monocytes  1.4 


 


Absolute Eosinophils  0.2 


 


Absolute Basophils  0.1 


 


Sodium   137.9


 


Potassium   4.3


 


Chloride   98


 


Carbon Dioxide   29


 


Anion Gap   11


 


BUN   9


 


Creatinine   0.60


 


Est GFR ( Amer)   > 60


 


Est GFR (Non-Af Amer)   > 60


 


Glucose   110


 


Calcium   8.8


 


Phosphorus   3.0


 


Magnesium   2.0


 


Total Bilirubin   0.6


 


AST   46


 


ALT   133 H


 


Alkaline Phosphatase   146 H


 


Total Protein   5.6 L


 


Albumin   3.0 L


 


Lipase   152.8








 











  05/18/18





  03:47


 


NT-Pro-B Natriuret Pep  303











Impressions: 


 





Cholangiogram  05/17/18 00:00


IMPRESSION:  INTRAOPERATIVE CHOLANGIOGRAM.


 








Abdomen Ultrasound  05/17/18 06:12


IMPRESSION:


 


1. Cholelithiasis without other evidence of acute cholecystitis.


 








Chest X-Ray  05/17/18 07:41


IMPRESSION:  Minimal linear densities in the right lung base most consistent 

with atelectatic changes.  Remaining lung fields are free of active 

infiltrates.  Other findings as noted above


 














Assessment & Plan





- Diagnosis


(1) Gallstone pancreatitis


Is this a current diagnosis for this admission?: Yes   





- Plan Summary


Plan Summary: 





A/





POD#2 after laparoscopic cholecystectomy with cholangiogram


VSS, AF


RASHMI output 40 mL


WBC decreased to 13


LFT demonstrated normal bilirubin and normalizing transaminates


Patient tokerate PO well








P/





Discharge to home today


F/u in the office with Dr. Spencer next week


Routine RASHMI care; empty bulb daily, record amount, bring output record to office 

appointment


sponge bath only


Resume diet and meds


Tylenol and Aleve for pain


No wound care needed

## 2018-05-19 NOTE — PDOC DISCHARGE SUMMARY
General





- Admit/Disc Date/PCP


Admission Date/Primary Care Provider: 


  05/17/18 07:47





  JUDY CHOWDARYSILVA





Discharge Date: 05/19/18





- Discharge Diagnosis


(1) Gallstone pancreatitis


Is this a current diagnosis for this admission?: Yes   


Summary: 


Patient was admitted to the hospital with right upper quadrant pain, evidence 

of pancreatitis, ultrasound showed cholelithiasis without evidence of 

cholecystitis.  Secondary to evidence of gallstone pancreatitis the patient was 

seen by surgery.  He underwent laparoscopic cholecystectomy and intraoperative 

cholangiogram without complication that the procedure was noted to have been 

difficult.  Postoperatively he is ambulating, feeling well, eating a little bit 

though abdomen is distended.  He has not had a bowel movement.  The surgeon is 

comfortable discharging him to home with a Dulcolax suppository prior to 

discharge and so this is what was done.  He was discharged with a RASHMI drain in 

place and instructions given to the patient on how to care for drain and 

monitor fluid in the home.  He is scheduled for surgical follow-up.  He is 

given instructions as to complications that would necessitate return to 

hospital.








(2) Abdominal distention


Is this a current diagnosis for this admission?: Yes   


Summary: 


Secondary to intra-abdominal abscess.  Not resolved on discharge.  Surgeon is 

aware.  It is not very uncomfortable.  No nausea or vomiting.  No significant 

abdominal pain.








(3) COPD (chronic obstructive pulmonary disease)


Is this a current diagnosis for this admission?: Yes   


Summary: 


Secondary to chronic hypoxemic respiratory failure and COPD he was seen by the 

pulmonologist prior to surgery.  Is cleared for surgical procedure.  He did 

well ultimately.  He is back to baseline in regards to his COPD.








(4) Chronic respiratory failure with hypoxia


Is this a current diagnosis for this admission?: Yes   


Summary: 


Secondary to COPD.  Discharged on his regular home meds and regular home oxygen.








- Additional Information


Resuscitation Status: Full Code


Discharge Diet: Regular


Discharge Activity: Activity As Tolerated, No tub bath, Other


Prescriptions: 


Ciprofloxacin HCl [Cipro 500 mg Tablet] 500 mg PO BID #14 tablet


Docusate Sodium [Colace 100 mg Capsule] 200 mg PO DAILY #20 capsule


Metronidazole [Flagyl 500 mg Tablet] 500 mg PO TID #21 tablet


Home Medications: 








Albuterol Sulfate [Proventil Hfa] 2 puff IH QID PRN 05/17/18 


Aspirin [Aspirin 81 mg Chewable Tablet] 81 mg PO DAILY 05/17/18 


Atorvastatin Calcium 40 mg PO DAILY 05/17/18 


Budesonide/Formoterol Fumarate [Symbicort 160-4.5 Mcg Inhaler] 2 puff IH BID 05/ 17/18 


Cholecalciferol (Vitamin D3) [Vitamin D3 1000 Unit Tablet] 1,000 unit PO DAILY 

05/17/18 


Diltiazem HCl [Dilt-Xr] 180 mg PO DAILY 05/17/18 


Omeprazole 40 mg PO DAILY 05/17/18 


Tiotropium Bromide [Spiriva Handihaler 18 mcg/dose (30 Dose)] 1 cap IH DAILY 05/ 17/18 


Triamterene/Hydrochlorothiazid [Triamterene-Hctz 37.5-25 mg Cp] 1 tab PO DAILY 

05/17/18 


Guaifenesin/Pseudoephedrne HCl [Mucinex D -60 mg Tablet] 1 each PO DAILY 

05/18/18 


Acetaminophen [Tylenol 325 mg Tablet] 650 mg PO Q4HP PRN  tablet 05/19/18 


Ciprofloxacin HCl [Cipro 500 mg Tablet] 500 mg PO BID #14 tablet 05/19/18 


Docusate Sodium [Colace 100 mg Capsule] 200 mg PO DAILY #20 capsule 05/19/18 


Metronidazole [Flagyl 500 mg Tablet] 500 mg PO TID #21 tablet 05/19/18 











History of Present Illness


History of Present Illness: 


PING WALKER is a 67 year old male








Hospital Course


Hospital Course: 


He presented to the hospital after developing sudden onset epigastric pain 

which was sharp and colicky.


He has been constipated for the past 2 days.


Denies any vomiting.  Appetite has been poor.


He was recently being treated with antibiotics that were started 4 days ago for 

a urinary tract infection.








Healthcare power of  is his son Chavez Walker.  Phone #[phone].


He requests to be a full code.





The patient reports chronic dyspnea on exertion which is unchanged.


Denies shortness of breath or chest pain at rest.  Denies exertional chest pain.


Does not recall ever having a stress test.


Denies a history of stroke or myocardial infarction.


No recent fever cough or illness other than a urinary tract infection.





Physical Exam


Vital Signs: 


 











Temp Pulse Resp BP Pulse Ox


 


 97.5 F   79   18   151/74 H  100 


 


 05/19/18 13:38  05/19/18 13:38  05/19/18 13:38  05/19/18 13:38  05/19/18 13:38








 Intake & Output











 05/18/18 05/19/18 05/20/18





 06:59 06:59 06:59


 


Intake Total 4050 2592 


 


Output Total 4100 2065 


 


Balance -50 527 


 


Weight 96.5 kg  











General appearance: PRESENT: no acute distress, cooperative, thin


Head exam: PRESENT: atraumatic, normocephalic


Eye exam: PRESENT: EOMI.  ABSENT: scleral icterus


Mouth exam: PRESENT: neck supple, tongue midline


Respiratory exam: PRESENT: clear to auscultation arturo, unlabored.  ABSENT: rales

, rhonchi, wheezes


Cardiovascular exam: PRESENT: RRR.  ABSENT: systolic murmur


Pulses: PRESENT: normal radial pulses


GI/Abdominal exam: PRESENT: distended, hypoactive bowel sounds, soft.  ABSENT: 

ascites, guarding, tenderness


Rectal exam: PRESENT: deferred


Extremities exam: ABSENT: pedal edema


Musculoskeletal exam: PRESENT: ambulatory, normal inspection


Neurological exam: PRESENT: alert, awake, oriented to person, oriented to place

, oriented to situation, CN II-XII grossly intact


Psychiatric exam: PRESENT: appropriate affect.  ABSENT: anxious


Skin exam: PRESENT: other - RASHMI drain emanating from right upper quadrant with 

serosanguineous fluid within, post op laparoscopic incisions clean dry and 

intact with Steri-Strips in place





Results


Laboratory Results: 


 





 05/19/18 04:31 





 05/19/18 04:31 





 











  05/19/18 05/19/18





  04:31 04:31


 


WBC  13.2 H 


 


RBC  3.93 L 


 


Hgb  12.0 L 


 


Hct  35.3 L 


 


MCV  90 


 


MCH  30.4 


 


MCHC  33.9 


 


RDW  13.2 


 


Plt Count  383 


 


Seg Neutrophils %  71.9 


 


Lymphocytes %  15.4 


 


Monocytes %  10.5 


 


Eosinophils %  1.4 


 


Basophils %  0.8 


 


Absolute Neutrophils  9.5 H 


 


Absolute Lymphocytes  2.0 


 


Absolute Monocytes  1.4 


 


Absolute Eosinophils  0.2 


 


Absolute Basophils  0.1 


 


Sodium   137.9


 


Potassium   4.3


 


Chloride   98


 


Carbon Dioxide   29


 


Anion Gap   11


 


BUN   9


 


Creatinine   0.60


 


Est GFR ( Amer)   > 60


 


Est GFR (Non-Af Amer)   > 60


 


Glucose   110


 


Calcium   8.8


 


Phosphorus   3.0


 


Magnesium   2.0


 


Total Bilirubin   0.6


 


AST   46


 


ALT   133 H


 


Alkaline Phosphatase   146 H


 


Total Protein   5.6 L


 


Albumin   3.0 L


 


Lipase   152.8








 











  05/18/18





  03:47


 


NT-Pro-B Natriuret Pep  303











Impressions: 


 





Cholangiogram  05/17/18 00:00


IMPRESSION:  INTRAOPERATIVE CHOLANGIOGRAM.


 








Abdomen Ultrasound  05/17/18 06:12


IMPRESSION:


 


1. Cholelithiasis without other evidence of acute cholecystitis.


 








Chest X-Ray  05/17/18 07:41


IMPRESSION:  Minimal linear densities in the right lung base most consistent 

with atelectatic changes.  Remaining lung fields are free of active 

infiltrates.  Other findings as noted above


 














Qualifiers





- *


PATIENT BEING DISCHARGED WITH ANY OF THE FOLLOWING DIAGNOSIS: No





Plan


Discharge Plan: 





Patient is to return to medical care with any concerning symptoms.  He is given 

wound care instructions for discharge.  He is given RASHMI drain instructions for 

discharge.  He will have surgical follow-up next week.  He is to resume home 

meds.


Time Spent: Greater than 30 Minutes - Including consultation with surgeon and 

discussion with patient's nurse

## 2018-05-20 NOTE — PDOC PROGRESS REPORT
Subjective


Progress Note for:: 05/18/18


Subjective:: 





sore but feeling better


Reason For Visit: 


PANCREATITIS, GALL STONE








Physical Exam


Vital Signs: 


 











Temp Pulse Resp BP Pulse Ox


 


 97.5 F   79   18   151/74 H  100 


 


 05/19/18 13:38  05/19/18 13:38  05/19/18 13:38  05/19/18 13:38  05/19/18 13:38








 Intake & Output











 05/19/18 05/20/18 05/21/18





 06:59 06:59 06:59


 


Intake Total 2592  


 


Output Total 2065  


 


Balance 527  











General appearance: PRESENT: no acute distress, cooperative


Head exam: PRESENT: atraumatic, normocephalic


Eye exam: PRESENT: conjunctiva pale, EOMI, PERRLA.  ABSENT: nystagmus, 

periorbital swelling, scleral icterus


Mouth exam: PRESENT: moist, neck supple, tongue midline


Neck exam: ABSENT: carotid bruit, JVD, lymphadenopathy, thyromegaly, tracheal 

deviation, tracheostomy


Respiratory exam: PRESENT: decreased breath sounds, prolonged expiratory phas, 

rhonchi, unlabored, wheezes.  ABSENT: rales, retraction, stridor, tachypnea


Cardiovascular exam: PRESENT: RRR, +S1, +S2


Pulses: PRESENT: normal radial pulses


GI/Abdominal exam: PRESENT: normal bowel sounds, other - s/p surgery


Extremities exam: PRESENT: full ROM.  ABSENT: calf tenderness, clubbing, joint 

swelling


Musculoskeletal exam: PRESENT: ambulatory, full ROM.  ABSENT: deformity, 

dislocation


Neurological exam: PRESENT: alert, awake


Psychiatric exam: PRESENT: normal mood


Skin exam: PRESENT: dry, warm





Results


Laboratory Results: 


 





 05/19/18 04:31 





 05/19/18 04:31 





 











  05/18/18





  03:47


 


NT-Pro-B Natriuret Pep  303











Impressions: 


 





Cholangiogram  05/17/18 00:00


IMPRESSION:  INTRAOPERATIVE CHOLANGIOGRAM.


 








Abdomen Ultrasound  05/17/18 06:12


IMPRESSION:


 


1. Cholelithiasis without other evidence of acute cholecystitis.


 








Chest X-Ray  05/17/18 07:41


IMPRESSION:  Minimal linear densities in the right lung base most consistent 

with atelectatic changes.  Remaining lung fields are free of active 

infiltrates.  Other findings as noted above


 














Assessment & Plan





- Diagnosis


(1) COPD (chronic obstructive pulmonary disease)


Qualifiers: 


   COPD type: emphysema   Emphysema type: unspecified   Qualified Code(s): 

J43.9 - Emphysema, unspecified   


Is this a current diagnosis for this admission?: Yes   


Plan: 


Continue current bronchodilator therapy stable at this time hopefully there 

will be no need for systemic steroids








(2) Chronic respiratory failure with hypoxia


Is this a current diagnosis for this admission?: Yes   


Plan: 


Long-standing O2 at home greater than 3 years continue supplemental oxygen 

maintaining sats between 90 and 94%








(3) GERD (gastroesophageal reflux disease)


Is this a current diagnosis for this admission?: Yes   





(4) Gallstone pancreatitis


Is this a current diagnosis for this admission?: Yes   


Plan: 


Probable surgical candidate

## 2018-05-20 NOTE — DISCHARGE SUMMARY E
Discharge Summary



NAME: PING WALKER

MRN:  [id number]        : 1950     AGE: 67Y

ADMITTED: 2018                  DISCHARGED: 2018



FINAL DIAGNOSIS:

1.  GALLSTONE PANCREATITIS.

2.  CHRONIC OBSTRUCTIVE PULMONARY DISEASE.



PROCEDURE:

On , the patient underwent laparoscopic cholecystectomy with

cholangiogram.



COMPLICATIONS:

None.



HOSPITAL COURSE:

This is a 67-year-old male with severe COPD, oxygen-dependent, who

presented to the hospital with right upper quadrant pain for about 2

weeks, intense nausea and emesis, found to have cholecystitis with

cholelithiasis.  Bilirubin of 1.2.  The patient was taken to surgery on

the same evening and underwent laparoscopic cholecystectomy with

cholangiogram.  Cholangiogram was normal.  His postop course was

unremarkable.  His white blood count was initially 14.1, increased to 15

on postop day 1, decreased to 13 on postop day 2.  His liver profile

demonstrated normal bilirubin and slightly elevated AST and ALT.  On the

day of discharge, the patient had no complaints.  He was tolerating p.o.

well.  His vital signs were within normal limits.  His liver profile

showed a normal bilirubin and slightly elevated AST and ALT.  His white

cell count was down to 13.  His physical exam was unremarkable.  He had a

Tashi-Manley drain in the right upper quadrant, draining about 40 mL of

fluid.



This patient was discharged home on 2018.  He was given a followup

appointment in the office with Dr. Spencer in about a week. He was

instructed to empty the Tashi-Manley drain bulb daily, record the amount

and bring the record to the office clinic.  He was given Tylenol or Aleve

as needed for pain.  Cipro 500 mg p.o. b.i.d. for 7 days, Flagyl 400 mg

p.o. 3 times a day for 7 days.  Resume home medications.  He is on a

regular diet.  Activities as tolerated.  At this point drain is in place.



DICTATING PHYSICIAN:  LUZ MARIA MAXWELL M.D.





[id number]                  DT: 2018    [id number]

PHY#: [id number]            DD: 2018    [id number]

ID:   [id number]           JOB#: [id number]       ACCT: [id number]



cc:LUZ MARIA MAXWELL M.D.

   [facility],

[facility]

## 2019-11-12 ENCOUNTER — HOSPITAL ENCOUNTER (OUTPATIENT)
Dept: HOSPITAL 62 - SC | Age: 69
Discharge: HOME | End: 2019-11-12
Attending: OPHTHALMOLOGY
Payer: OTHER GOVERNMENT

## 2019-11-12 DIAGNOSIS — H35.3131: ICD-10-CM

## 2019-11-12 DIAGNOSIS — H25.813: Primary | ICD-10-CM

## 2019-11-12 DIAGNOSIS — Z79.899: ICD-10-CM

## 2019-11-12 DIAGNOSIS — I10: ICD-10-CM

## 2019-11-12 DIAGNOSIS — Z87.891: ICD-10-CM

## 2019-11-12 DIAGNOSIS — H57.03: ICD-10-CM

## 2019-11-12 DIAGNOSIS — Z79.51: ICD-10-CM

## 2019-11-12 DIAGNOSIS — J44.9: ICD-10-CM

## 2019-11-12 PROCEDURE — 00142 ANES PX ON EYE LENS SURGERY: CPT

## 2019-11-12 PROCEDURE — 66982 XCAPSL CTRC RMVL CPLX WO ECP: CPT

## 2019-11-12 PROCEDURE — V2632 POST CHMBR INTRAOCULAR LENS: HCPCS

## 2019-11-12 RX ADMIN — TROPICAMIDE PRN DROP: 10 SOLUTION/ DROPS OPHTHALMIC at 12:16

## 2019-11-12 RX ADMIN — DORZOLAMIDE HYDROCHLORIDE AND TIMOLOL MALEATE PRN DROP: 20; 5 SOLUTION OPHTHALMIC at 00:00

## 2019-11-12 RX ADMIN — TROPICAMIDE PRN DROP: 10 SOLUTION/ DROPS OPHTHALMIC at 11:56

## 2019-11-12 RX ADMIN — CYCLOPENTOLATE HYDROCHLORIDE AND PHENYLEPHRINE HYDROCHLORIDE PRN DROP: 2; 10 SOLUTION/ DROPS OPHTHALMIC at 12:16

## 2019-11-12 RX ADMIN — BESIFLOXACIN PRN DROP: 6 SUSPENSION OPHTHALMIC at 12:07

## 2019-11-12 RX ADMIN — TETRACAINE HYDROCHLORIDE PRN DROP: 5 SOLUTION OPHTHALMIC at 12:30

## 2019-11-12 RX ADMIN — TETRACAINE HYDROCHLORIDE PRN DROP: 5 SOLUTION OPHTHALMIC at 12:18

## 2019-11-12 RX ADMIN — TROPICAMIDE PRN DROP: 10 SOLUTION/ DROPS OPHTHALMIC at 12:05

## 2019-11-12 RX ADMIN — CYCLOPENTOLATE HYDROCHLORIDE AND PHENYLEPHRINE HYDROCHLORIDE PRN DROP: 2; 10 SOLUTION/ DROPS OPHTHALMIC at 11:56

## 2019-11-12 RX ADMIN — TETRACAINE HYDROCHLORIDE PRN DROP: 5 SOLUTION OPHTHALMIC at 11:55

## 2019-11-12 RX ADMIN — BESIFLOXACIN PRN DROP: 6 SUSPENSION OPHTHALMIC at 12:49

## 2019-11-12 RX ADMIN — BESIFLOXACIN PRN DROP: 6 SUSPENSION OPHTHALMIC at 00:00

## 2019-11-12 RX ADMIN — BESIFLOXACIN PRN DROP: 6 SUSPENSION OPHTHALMIC at 11:56

## 2019-11-12 RX ADMIN — CYCLOPENTOLATE HYDROCHLORIDE AND PHENYLEPHRINE HYDROCHLORIDE PRN DROP: 2; 10 SOLUTION/ DROPS OPHTHALMIC at 12:05

## 2019-11-12 RX ADMIN — DORZOLAMIDE HYDROCHLORIDE AND TIMOLOL MALEATE PRN DROP: 20; 5 SOLUTION OPHTHALMIC at 12:49

## 2019-11-13 NOTE — OPERATIVE REPORT
Operative Report-[facility]


Operative Report: 





DATE OF SURGERY: 11/12/2019


PREOPERATIVE DIAGNOSIS: Cataract, right eye, Pupil Miosis


POSTOPERATIVE DIAGNOSIS: Cataract, right eye, Pupil miosis


 OPERATION:  Complex Cataract extraction with insertion of an IOL of the right 

eye and use of a maluygan ring due to pupil dilation of less than 4mm.


Intraocular Lens Model: [20.5 sn60wf] Reason for surgery was difficulty seeing 

road signs


SURGEON: Haroldo Chavis MD


ANESTHESIA: Topical


PROCEDURE: After obtaining appropriate consent, the patient's right eye was 

prepped and draped in a sterile fashion as well as the surgeon in the sterile 

manner and cataract surgery was started. First a paracentesis blade was used to 

make a side-port incision. Viscoelastic was used to inflate the anterior 

chamber. Next a 2.4 mm incision was made with a 2.4 mm blade, clear corneal 

temporarily.  A continuous capsulorrhexis was made using a cystotome and Utrata 

forceps. Following this hydrodissection was carried out to make the lens fully 

loose and mobile and it was rotated freely. Following this, a divide and conquer

technique was used to phacoemulsify the lens. The remaining cortex was removed 

with an irrigation/aspiration. Provisc was instilled into the capsular bag to 

inflate the bag. The intraocular lens was placed. The remaining viscoelastic 

material was removed with irrigation/aspiration. Following this, the incision 

was found to be watertight. Prior to making the capsulorhexis a maluygan ring 

was inserted due to poor pupillary dilation. This was removed at the end of the 

case.  Besivance and Cosopt was instilled into the eye and a protective shield 

was placed over the eye. The patient was returned to the postoperative recovery 

in a stable condition.

## 2019-12-12 ENCOUNTER — HOSPITAL ENCOUNTER (OUTPATIENT)
Dept: HOSPITAL 62 - SC | Age: 69
Discharge: HOME | End: 2019-12-12
Attending: OPHTHALMOLOGY
Payer: OTHER GOVERNMENT

## 2019-12-12 DIAGNOSIS — I10: ICD-10-CM

## 2019-12-12 DIAGNOSIS — Z79.899: ICD-10-CM

## 2019-12-12 DIAGNOSIS — Z79.51: ICD-10-CM

## 2019-12-12 DIAGNOSIS — Z79.82: ICD-10-CM

## 2019-12-12 DIAGNOSIS — H25.812: Primary | ICD-10-CM

## 2019-12-12 DIAGNOSIS — H57.03: ICD-10-CM

## 2019-12-12 DIAGNOSIS — Z99.81: ICD-10-CM

## 2019-12-12 DIAGNOSIS — J44.9: ICD-10-CM

## 2019-12-12 DIAGNOSIS — Z96.1: ICD-10-CM

## 2019-12-12 PROCEDURE — 66982 XCAPSL CTRC RMVL CPLX WO ECP: CPT

## 2019-12-12 PROCEDURE — V2632 POST CHMBR INTRAOCULAR LENS: HCPCS

## 2019-12-12 PROCEDURE — 00142 ANES PX ON EYE LENS SURGERY: CPT

## 2019-12-12 RX ADMIN — CYCLOPENTOLATE HYDROCHLORIDE AND PHENYLEPHRINE HYDROCHLORIDE PRN DROP: 2; 10 SOLUTION/ DROPS OPHTHALMIC at 10:09

## 2019-12-12 RX ADMIN — TROPICAMIDE PRN DROP: 10 SOLUTION/ DROPS OPHTHALMIC at 09:59

## 2019-12-12 RX ADMIN — CYCLOPENTOLATE HYDROCHLORIDE AND PHENYLEPHRINE HYDROCHLORIDE PRN DROP: 2; 10 SOLUTION/ DROPS OPHTHALMIC at 10:19

## 2019-12-12 RX ADMIN — BESIFLOXACIN PRN DROP: 6 SUSPENSION OPHTHALMIC at 00:00

## 2019-12-12 RX ADMIN — CYCLOPENTOLATE HYDROCHLORIDE AND PHENYLEPHRINE HYDROCHLORIDE PRN DROP: 2; 10 SOLUTION/ DROPS OPHTHALMIC at 09:59

## 2019-12-12 RX ADMIN — TETRACAINE HYDROCHLORIDE PRN DROP: 5 SOLUTION OPHTHALMIC at 10:30

## 2019-12-12 RX ADMIN — DORZOLAMIDE HYDROCHLORIDE AND TIMOLOL MALEATE PRN DROP: 20; 5 SOLUTION OPHTHALMIC at 10:49

## 2019-12-12 RX ADMIN — BESIFLOXACIN PRN DROP: 6 SUSPENSION OPHTHALMIC at 10:19

## 2019-12-12 RX ADMIN — TETRACAINE HYDROCHLORIDE PRN DROP: 5 SOLUTION OPHTHALMIC at 10:19

## 2019-12-12 RX ADMIN — BESIFLOXACIN PRN DROP: 6 SUSPENSION OPHTHALMIC at 10:49

## 2019-12-12 RX ADMIN — BESIFLOXACIN PRN DROP: 6 SUSPENSION OPHTHALMIC at 09:59

## 2019-12-12 RX ADMIN — TROPICAMIDE PRN DROP: 10 SOLUTION/ DROPS OPHTHALMIC at 10:09

## 2019-12-12 RX ADMIN — TETRACAINE HYDROCHLORIDE PRN DROP: 5 SOLUTION OPHTHALMIC at 10:00

## 2019-12-12 RX ADMIN — TROPICAMIDE PRN DROP: 10 SOLUTION/ DROPS OPHTHALMIC at 10:19

## 2019-12-12 RX ADMIN — DORZOLAMIDE HYDROCHLORIDE AND TIMOLOL MALEATE PRN DROP: 20; 5 SOLUTION OPHTHALMIC at 00:00

## 2019-12-12 NOTE — OPERATIVE REPORT
Operative Report-[facility]


Operative Report: 





DATE OF SURGERY: 12/12/2019


PREOPERATIVE DIAGNOSIS: Cataract, left eye, Pupil Miosis


POSTOPERATIVE DIAGNOSIS: Cataract, left eye, Pupil miosis


 OPERATION:  Complex Cataract extraction with insertion of an IOL of the left 

eye and use of a maluygan ring due to pupil dilation of less than 4mm.


Intraocular Lens Model: [20.5 sn60wf] Reason for surgery was difficulty seeing 

words on the television


SURGEON: Haroldo Chavis MD


ANESTHESIA: Topical


PROCEDURE: After obtaining appropriate consent, the patient's left eye was 

prepped and draped in a sterile fashion as well as the surgeon in the sterile 

manner and cataract surgery was started. First a paracentesis blade was used to 

make a side-port incision. Viscoelastic was used to inflate the anterior 

chamber. Next a 2.4 mm incision was made with a 2.4 mm blade, clear corneal 

temporarily.  A continuous capsulorrhexis was made using a cystotome and Utrata 

forceps. Following this hydrodissection was carried out to make the lens fully 

loose and mobile and it was rotated freely. Following this, a divide and conquer

technique was used to phacoemulsify the lens.. The remaining cortex was removed 

with an irrigation/aspiration. Provisc was instilled into the capsular bag to 

inflate the bag. The intraocular lens was placed. The remaining viscoelastic mat

erial was removed with irrigation/aspiration. Following this, the incision was 

found to be watertight. Prior to making the capsulorhexis a maluygan ring was 

inserted due to poor pupillary dilation. This was removed at the end of the 

case.  Besivance and Cosopt was instilled into the eye and a protective shield 

was placed over the eye. The patient was returned to the postoperative recovery 

in a stable condition.